# Patient Record
Sex: FEMALE | Race: WHITE | NOT HISPANIC OR LATINO | Employment: FULL TIME | URBAN - METROPOLITAN AREA
[De-identification: names, ages, dates, MRNs, and addresses within clinical notes are randomized per-mention and may not be internally consistent; named-entity substitution may affect disease eponyms.]

---

## 2017-01-23 LAB
ABO + RH BLD: NORMAL
ABO + RH BLD: NORMAL
RUBELLA ANTIBODY IGG QUANTITATIVE: NORMAL IU/ML

## 2017-02-20 ENCOUNTER — TRANSFERRED RECORDS (OUTPATIENT)
Dept: HEALTH INFORMATION MANAGEMENT | Facility: CLINIC | Age: 37
End: 2017-02-20

## 2017-02-21 ENCOUNTER — HOSPITAL ENCOUNTER (OUTPATIENT)
Facility: CLINIC | Age: 37
Discharge: HOME | End: 2017-02-21
Attending: OBSTETRICS & GYNECOLOGY | Admitting: OBSTETRICS & GYNECOLOGY
Payer: COMMERCIAL

## 2017-02-21 ENCOUNTER — TRANSFERRED RECORDS (OUTPATIENT)
Dept: HEALTH INFORMATION MANAGEMENT | Facility: CLINIC | Age: 37
End: 2017-02-21

## 2017-02-21 VITALS
SYSTOLIC BLOOD PRESSURE: 126 MMHG | RESPIRATION RATE: 18 BRPM | WEIGHT: 174 LBS | BODY MASS INDEX: 28.99 KG/M2 | DIASTOLIC BLOOD PRESSURE: 78 MMHG | TEMPERATURE: 98.5 F | HEIGHT: 65 IN

## 2017-02-21 PROBLEM — N94.89 ADNEXAL MASS: Status: ACTIVE | Noted: 2017-02-21

## 2017-02-21 LAB
ABO + RH BLD: NORMAL
ALBUMIN SERPL-MCNC: 3.7 G/DL (ref 3.4–5)
ALP SERPL-CCNC: 43 U/L (ref 40–150)
ALT SERPL W P-5'-P-CCNC: 20 U/L (ref 0–50)
ANION GAP SERPL CALCULATED.3IONS-SCNC: 5 MMOL/L (ref 3–14)
AST SERPL W P-5'-P-CCNC: 19 U/L (ref 0–45)
BILIRUB SERPL-MCNC: 0.4 MG/DL (ref 0.2–1.3)
BLD GP AB SCN SERPL QL: NORMAL
BLOOD BANK CMNT PATIENT-IMP: NORMAL
BLOOD BANK CMNT PATIENT-IMP: NORMAL
BUN SERPL-MCNC: 8 MG/DL (ref 7–30)
CALCIUM SERPL-MCNC: 8.4 MG/DL (ref 8.5–10.1)
CHLORIDE SERPL-SCNC: 107 MMOL/L (ref 94–109)
CO2 SERPL-SCNC: 28 MMOL/L (ref 20–32)
CREAT SERPL-MCNC: 0.72 MG/DL (ref 0.52–1.04)
DATE RH IMM GL GVN: NORMAL
ERYTHROCYTE [DISTWIDTH] IN BLOOD BY AUTOMATED COUNT: 13.5 % (ref 10–15)
FETAL CELL SCN BLD QL ROSETTE: NORMAL
GFR SERPL CREATININE-BSD FRML MDRD: ABNORMAL ML/MIN/1.7M2
GLUCOSE SERPL-MCNC: 106 MG/DL (ref 70–99)
HCT VFR BLD AUTO: 37.2 % (ref 35–47)
HGB BLD-MCNC: 12.5 G/DL (ref 11.7–15.7)
MCH RBC QN AUTO: 29.2 PG (ref 26.5–33)
MCHC RBC AUTO-ENTMCNC: 33.6 G/DL (ref 31.5–36.5)
MCV RBC AUTO: 87 FL (ref 78–100)
PLATELET # BLD AUTO: 234 10E9/L (ref 150–450)
POTASSIUM SERPL-SCNC: 4 MMOL/L (ref 3.4–5.3)
PROT SERPL-MCNC: 6.9 G/DL (ref 6.8–8.8)
RBC # BLD AUTO: 4.28 10E12/L (ref 3.8–5.2)
RH IG VIALS RECOM PATIENT: NORMAL
SODIUM SERPL-SCNC: 140 MMOL/L (ref 133–144)
SPECIMEN EXP DATE BLD: NORMAL
WBC # BLD AUTO: 6.9 10E9/L (ref 4–11)

## 2017-02-21 PROCEDURE — 96372 THER/PROPH/DIAG INJ SC/IM: CPT

## 2017-02-21 PROCEDURE — 85461 HEMOGLOBIN FETAL: CPT | Performed by: OBSTETRICS & GYNECOLOGY

## 2017-02-21 PROCEDURE — 96401 CHEMO ANTI-NEOPL SQ/IM: CPT

## 2017-02-21 PROCEDURE — 86850 RBC ANTIBODY SCREEN: CPT | Performed by: OBSTETRICS & GYNECOLOGY

## 2017-02-21 PROCEDURE — 86900 BLOOD TYPING SEROLOGIC ABO: CPT | Performed by: OBSTETRICS & GYNECOLOGY

## 2017-02-21 PROCEDURE — 25000128 H RX IP 250 OP 636: Performed by: OBSTETRICS & GYNECOLOGY

## 2017-02-21 PROCEDURE — 85027 COMPLETE CBC AUTOMATED: CPT | Performed by: OBSTETRICS & GYNECOLOGY

## 2017-02-21 PROCEDURE — 36415 COLL VENOUS BLD VENIPUNCTURE: CPT | Performed by: OBSTETRICS & GYNECOLOGY

## 2017-02-21 PROCEDURE — 86901 BLOOD TYPING SEROLOGIC RH(D): CPT | Mod: 91 | Performed by: OBSTETRICS & GYNECOLOGY

## 2017-02-21 PROCEDURE — 80053 COMPREHEN METABOLIC PANEL: CPT | Performed by: OBSTETRICS & GYNECOLOGY

## 2017-02-21 PROCEDURE — 25000125 ZZHC RX 250: Performed by: OBSTETRICS & GYNECOLOGY

## 2017-02-21 RX ORDER — METHOTREXATE 25 MG/ML
25 INJECTION, SOLUTION INTRA-ARTERIAL; INTRAMUSCULAR; INTRAVENOUS
Status: COMPLETED | OUTPATIENT
Start: 2017-02-21 | End: 2017-02-21

## 2017-02-21 RX ORDER — CYCLOSPORINE 0.5 MG/ML
1 EMULSION OPHTHALMIC 2 TIMES DAILY
COMMUNITY

## 2017-02-21 RX ADMIN — HUMAN RHO(D) IMMUNE GLOBULIN 300 MCG: 300 INJECTION, SOLUTION INTRAMUSCULAR at 14:02

## 2017-02-21 RX ADMIN — METHOTREXATE 47.5 MG: 25 INJECTION, SOLUTION INTRA-ARTERIAL; INTRAMUSCULAR; INTRATHECAL; INTRAVENOUS at 13:13

## 2017-02-21 RX ADMIN — METHOTREXATE 47.5 MG: 25 INJECTION, SOLUTION INTRA-ARTERIAL; INTRAMUSCULAR; INTRATHECAL; INTRAVENOUS at 13:10

## 2017-02-21 NOTE — IP AVS SNAPSHOT
[address]    [facility] [address]    Phone:  [phone]                                       After Visit Summary   2/21/2017    Janki Contreras    MRN: [id number]           After Visit Summary Signature Page     I have received my discharge instructions, and my questions have been answered. I have discussed any challenges I see with this plan with the nurse or doctor.    ..........................................................................................................................................  Patient/Patient Representative Signature      ..........................................................................................................................................  Patient Representative Print Name and Relationship to Patient    ..................................................               ................................................  Date                                            Time    ..........................................................................................................................................  Reviewed by Signature/Title    ...................................................              ..............................................  Date                                                            Time

## 2017-02-21 NOTE — DISCHARGE INSTRUCTIONS
Methotrexate to Treat an Ectopic Pregnancy  An ectopic pregnancy is when a fertilized egg implants outside the womb (uterus). In most cases, it implants in a fallopian tube. This is a tube that goes from the uterus to an ovary. When this happens, the embryo can t grow normally. In some cases, it may stop growing quickly. Or it may grow until the fallopian tube tears (ruptures). This can cause severe bleeding and a risk for death for the mother.  Methotrexate is a medicine that stops the embryo from growing. The tissue is then absorbed by the mother s body. This treatment can prevent the rupture, bleeding, and risk of death to the mother. Methotrexate is often used instead of surgery to remove the fetus. Surgery has risks, like bleeding, infection, scarring of the fallopian tube, infertility, and the risks of anesthesia.  Having methotrexate treatment  Methotrexate is most often given by a shot (injection) into a muscle. It can also be given through an IV.  After having the shot, you may have:    Mild abdominal pain or cramping    Nausea and vomiting    Diarrhea    Fatigue  Care at home  Once you are home, you can resume normal activities as you are able. You will have some bleeding and pain. While you are recovering, you can use acetaminophen for pain if advised by your healthcare provider.  Until your healthcare provider says it s OK, do NOT:    Take anti-inflammatory medicines (NSAIDs), like aspirin, ibuprofen, or naproxen    Have foods or vitamins that contain folic acid or folate (for example, prenatal vitamins have folate)    Drink alcohol    Take penicillin or certain other antibiotics    Use tampons or douche    Have sexual intercourse  Make sure to:    Avoid the sun during the first week after your shot. Sun can cause a rash during this time.    Use birth control for at least 3 months after treatment.    Talk with a counselor if you feel sadness or grief after pregnancy loss.  Follow up  You will have  blood tests several times in the weeks after you have the shot. This is to make sure that your pregnancy hormone (HCG) level is getting lower. This shows that the fetus is no longer growing. It may take up to 4 weeks for your level to drop to zero. Most women need only 1 shot. If HCG levels are not low enough, your healthcare provider may give you a second shot. In some cases, this treatment does not work and surgery is needed. Your healthcare provider can tell you more about surgery for ectopic pregnancy.     When to call the healthcare provider  Call your healthcare provider if you have:    Lower abdominal pain that gets worse or doesn t go away    Heavy vaginal bleeding    Nausea or vomiting that needs treatment    Dizziness, weakness, or fainting    Fever of 100.4 F (38 C) or higher     0182-1767 [employer]. [address] [place] [address], [place] [address]. All rights reserved. This information is not intended as a substitute for professional medical care. Always follow your healthcare professional's instructions.

## 2017-02-21 NOTE — IP AVS SNAPSHOT
MRN:[id number]                      After Visit Summary   2/21/2017    Janki Contreras    MRN: [id number]           Thank you!     Thank you for choosing [facility] for your care. Our goal is always to provide you with excellent care. Hearing back from our patients is one way we can continue to improve our services. Please take a few minutes to complete the written survey that you may receive in the mail after you visit with us. Thank you!        Patient Information     Date Of Birth          1980        About your hospital stay     You were admitted on:  February 21, 2017 You last received care in the:  [facility]    You were discharged on:  February 21, 2017       Who to Call     For medical emergencies, please call 911.  For non-urgent questions about your medical care, please call your primary care provider or clinic, None          Attending Provider     Provider Specialty    Linda Loco MD OB/Gyn       Primary Care Provider    None Specified       No primary provider on file.        After Care Instructions     Discharge Instructions                 Further instructions from your care team         Methotrexate to Treat an Ectopic Pregnancy  An ectopic pregnancy is when a fertilized egg implants outside the womb (uterus). In most cases, it implants in a fallopian tube. This is a tube that goes from the uterus to an ovary. When this happens, the embryo can t grow normally. In some cases, it may stop growing quickly. Or it may grow until the fallopian tube tears (ruptures). This can cause severe bleeding and a risk for death for the mother.  Methotrexate is a medicine that stops the embryo from growing. The tissue is then absorbed by the mother s body. This treatment can prevent the rupture, bleeding, and risk of death to the mother. Methotrexate is often used instead of surgery to remove the fetus. Surgery has risks, like bleeding, infection, scarring of  the fallopian tube, infertility, and the risks of anesthesia.  Having methotrexate treatment  Methotrexate is most often given by a shot (injection) into a muscle. It can also be given through an IV.  After having the shot, you may have:    Mild abdominal pain or cramping    Nausea and vomiting    Diarrhea    Fatigue  Care at home  Once you are home, you can resume normal activities as you are able. You will have some bleeding and pain. While you are recovering, you can use acetaminophen for pain if advised by your healthcare provider.  Until your healthcare provider says it s OK, do NOT:    Take anti-inflammatory medicines (NSAIDs), like aspirin, ibuprofen, or naproxen    Have foods or vitamins that contain folic acid or folate (for example, prenatal vitamins have folate)    Drink alcohol    Take penicillin or certain other antibiotics    Use tampons or douche    Have sexual intercourse  Make sure to:    Avoid the sun during the first week after your shot. Sun can cause a rash during this time.    Use birth control for at least 3 months after treatment.    Talk with a counselor if you feel sadness or grief after pregnancy loss.  Follow up  You will have blood tests several times in the weeks after you have the shot. This is to make sure that your pregnancy hormone (HCG) level is getting lower. This shows that the fetus is no longer growing. It may take up to 4 weeks for your level to drop to zero. Most women need only 1 shot. If HCG levels are not low enough, your healthcare provider may give you a second shot. In some cases, this treatment does not work and surgery is needed. Your healthcare provider can tell you more about surgery for ectopic pregnancy.     When to call the healthcare provider  Call your healthcare provider if you have:    Lower abdominal pain that gets worse or doesn t go away    Heavy vaginal bleeding    Nausea or vomiting that needs treatment    Dizziness, weakness, or fainting    Fever of  "100.4 F (38 C) or higher     5834-2214 [employer]. [address] [place] [address], [place] [address]. All rights reserved. This information is not intended as a substitute for professional medical care. Always follow your healthcare professional's instructions.          Pending Results     Date and Time Order Name Status Description    2017 1110 Rh Immune Globulin Study In process             Admission Information     Date & Time Provider Department Dept. Phone    2017 Ericheller, April MD [facility] [phone]      Your Vitals Were     Blood Pressure Temperature Respirations Height Weight BMI (Body Mass Index)    122/81 98.5  F (36.9  C) (Temporal) 18 1.651 m (5' 5\") 78.9 kg (174 lb) 28.96 kg/m2      [employer] Information     [employer] lets you send messages to your doctor, view your test results, renew your prescriptions, schedule appointments and more. To sign up, go to [email or web address] . Click on \"Log in\" on the left side of the screen, which will take you to the Welcome page. Then click on \"Sign up Now\" on the right side of the page.     You will be asked to enter the access code listed below, as well as some personal information. Please follow the directions to create your username and password.     Your access code is: [email or web address]  Expires: 2017  1:16 PM     Your access code will  in 90 days. If you need help or a new code, please call your [place] clinic or [phone].        Care EveryWhere ID     This is your Care EveryWhere ID. This could be used by other organizations to access your [facility] medical records  [id number]           Review of your medicines      UNREVIEWED medicines. Ask your doctor about these medicines        Dose / Directions    cycloSPORINE 0.05 % ophthalmic emulsion   Commonly known as:  RESTASIS        Dose:  1 drop   Place 1 drop into both eyes 2 times daily   Refills:  0       SYNTHROID PO   Indication:  Underactive " Thyroid        Dose:  25 mcg   Take 25 mcg by mouth daily   Refills:  0       VITAMIN D (CHOLECALCIFEROL) PO        Dose:  3000 Units   Take 3,000 Units by mouth daily   Refills:  0       ZANTAC PO        Dose:  150 mg   Take 150 mg by mouth 2 times daily   Refills:  0                Protect others around you: Learn how to safely use, store and throw away your medicines at www.disposemymeds.org.             Medication List: This is a list of all your medications and when to take them. Check marks below indicate your daily home schedule. Keep this list as a reference.      Medications           Morning Afternoon Evening Bedtime As Needed    cycloSPORINE 0.05 % ophthalmic emulsion   Commonly known as:  RESTASIS   Place 1 drop into both eyes 2 times daily                                SYNTHROID PO   Take 25 mcg by mouth daily                                VITAMIN D (CHOLECALCIFEROL) PO   Take 3,000 Units by mouth daily                                ZANTAC PO   Take 150 mg by mouth 2 times daily

## 2017-02-21 NOTE — PLAN OF CARE
1035: Pt presents to [facility] for Methotrexate Injection. Consent signed in clinic. US and HCG done. Other labs ordered.  HCG from 's office: 1/30/17 --42.2     2/3/17 -    99.4     2/6/17  -  118.8     2/20/17-- 146.3    Meds given (see MAR). Pt observed per unit protocol  Discharge instructions reviewed with pt who verbalized understanding.  Pt left unit, stable and ambulatory, at 1419.

## 2017-03-03 ENCOUNTER — APPOINTMENT (OUTPATIENT)
Dept: ULTRASOUND IMAGING | Facility: CLINIC | Age: 37
End: 2017-03-03
Attending: EMERGENCY MEDICINE
Payer: COMMERCIAL

## 2017-03-03 ENCOUNTER — HOSPITAL ENCOUNTER (EMERGENCY)
Facility: CLINIC | Age: 37
Discharge: HOME | End: 2017-03-03
Attending: EMERGENCY MEDICINE | Admitting: EMERGENCY MEDICINE
Payer: COMMERCIAL

## 2017-03-03 VITALS
RESPIRATION RATE: 14 BRPM | HEIGHT: 65 IN | SYSTOLIC BLOOD PRESSURE: 111 MMHG | BODY MASS INDEX: 28.32 KG/M2 | DIASTOLIC BLOOD PRESSURE: 68 MMHG | TEMPERATURE: 98.2 F | WEIGHT: 170 LBS | OXYGEN SATURATION: 96 %

## 2017-03-03 DIAGNOSIS — O00.109 TUBAL PREGNANCY WITHOUT INTRAUTERINE PREGNANCY: ICD-10-CM

## 2017-03-03 LAB
ALBUMIN SERPL-MCNC: 4 G/DL (ref 3.4–5)
ALBUMIN UR-MCNC: NEGATIVE MG/DL
ALP SERPL-CCNC: 49 U/L (ref 40–150)
ALT SERPL W P-5'-P-CCNC: 22 U/L (ref 0–50)
ANION GAP SERPL CALCULATED.3IONS-SCNC: 8 MMOL/L (ref 3–14)
APPEARANCE UR: CLEAR
AST SERPL W P-5'-P-CCNC: 16 U/L (ref 0–45)
B-HCG SERPL-ACNC: 159 IU/L
BASOPHILS # BLD AUTO: 0 10E9/L (ref 0–0.2)
BASOPHILS NFR BLD AUTO: 0.4 %
BILIRUB DIRECT SERPL-MCNC: <0.1 MG/DL (ref 0–0.2)
BILIRUB SERPL-MCNC: 0.4 MG/DL (ref 0.2–1.3)
BILIRUB UR QL STRIP: NEGATIVE
BUN SERPL-MCNC: 10 MG/DL (ref 7–30)
CALCIUM SERPL-MCNC: 8.9 MG/DL (ref 8.5–10.1)
CHLORIDE SERPL-SCNC: 105 MMOL/L (ref 94–109)
CO2 SERPL-SCNC: 26 MMOL/L (ref 20–32)
COLOR UR AUTO: ABNORMAL
CREAT SERPL-MCNC: 0.77 MG/DL (ref 0.52–1.04)
DIFFERENTIAL METHOD BLD: NORMAL
EOSINOPHIL # BLD AUTO: 0.3 10E9/L (ref 0–0.7)
EOSINOPHIL NFR BLD AUTO: 4.1 %
ERYTHROCYTE [DISTWIDTH] IN BLOOD BY AUTOMATED COUNT: 13.2 % (ref 10–15)
GFR SERPL CREATININE-BSD FRML MDRD: 86 ML/MIN/1.7M2
GLUCOSE SERPL-MCNC: 108 MG/DL (ref 70–99)
GLUCOSE UR STRIP-MCNC: NEGATIVE MG/DL
HCT VFR BLD AUTO: 37.2 % (ref 35–47)
HGB BLD-MCNC: 12.5 G/DL (ref 11.7–15.7)
HGB UR QL STRIP: ABNORMAL
IMM GRANULOCYTES # BLD: 0 10E9/L (ref 0–0.4)
IMM GRANULOCYTES NFR BLD: 0.1 %
KETONES UR STRIP-MCNC: NEGATIVE MG/DL
LEUKOCYTE ESTERASE UR QL STRIP: NEGATIVE
LYMPHOCYTES # BLD AUTO: 1.6 10E9/L (ref 0.8–5.3)
LYMPHOCYTES NFR BLD AUTO: 23.8 %
MCH RBC QN AUTO: 29.3 PG (ref 26.5–33)
MCHC RBC AUTO-ENTMCNC: 33.6 G/DL (ref 31.5–36.5)
MCV RBC AUTO: 87 FL (ref 78–100)
MONOCYTES # BLD AUTO: 0.4 10E9/L (ref 0–1.3)
MONOCYTES NFR BLD AUTO: 5.7 %
NEUTROPHILS # BLD AUTO: 4.5 10E9/L (ref 1.6–8.3)
NEUTROPHILS NFR BLD AUTO: 65.9 %
NITRATE UR QL: NEGATIVE
NRBC # BLD AUTO: 0 10*3/UL
NRBC BLD AUTO-RTO: 0 /100
PH UR STRIP: 6.5 PH (ref 5–7)
PLATELET # BLD AUTO: 193 10E9/L (ref 150–450)
POTASSIUM SERPL-SCNC: 3.8 MMOL/L (ref 3.4–5.3)
PROT SERPL-MCNC: 7.6 G/DL (ref 6.8–8.8)
RBC # BLD AUTO: 4.27 10E12/L (ref 3.8–5.2)
RBC #/AREA URNS AUTO: <1 /HPF (ref 0–2)
SODIUM SERPL-SCNC: 139 MMOL/L (ref 133–144)
SP GR UR STRIP: 1 (ref 1–1.03)
URN SPEC COLLECT METH UR: ABNORMAL
UROBILINOGEN UR STRIP-MCNC: NORMAL MG/DL (ref 0–2)
WBC # BLD AUTO: 6.8 10E9/L (ref 4–11)
WBC #/AREA URNS AUTO: 1 /HPF (ref 0–2)

## 2017-03-03 PROCEDURE — 99285 EMERGENCY DEPT VISIT HI MDM: CPT | Mod: 25

## 2017-03-03 PROCEDURE — 80076 HEPATIC FUNCTION PANEL: CPT | Performed by: EMERGENCY MEDICINE

## 2017-03-03 PROCEDURE — 96401 CHEMO ANTI-NEOPL SQ/IM: CPT

## 2017-03-03 PROCEDURE — 85025 COMPLETE CBC W/AUTO DIFF WBC: CPT | Performed by: EMERGENCY MEDICINE

## 2017-03-03 PROCEDURE — 81001 URINALYSIS AUTO W/SCOPE: CPT | Performed by: EMERGENCY MEDICINE

## 2017-03-03 PROCEDURE — 84702 CHORIONIC GONADOTROPIN TEST: CPT | Performed by: EMERGENCY MEDICINE

## 2017-03-03 PROCEDURE — 25000125 ZZHC RX 250: Performed by: EMERGENCY MEDICINE

## 2017-03-03 PROCEDURE — 76801 OB US < 14 WKS SINGLE FETUS: CPT

## 2017-03-03 PROCEDURE — 80048 BASIC METABOLIC PNL TOTAL CA: CPT | Performed by: EMERGENCY MEDICINE

## 2017-03-03 RX ORDER — METHOTREXATE 25 MG/ML
25 INJECTION, SOLUTION INTRA-ARTERIAL; INTRAMUSCULAR; INTRAVENOUS
Status: COMPLETED | OUTPATIENT
Start: 2017-03-03 | End: 2017-03-03

## 2017-03-03 RX ORDER — METHOTREXATE 25 MG/ML
25 INJECTION, SOLUTION INTRA-ARTERIAL; INTRAMUSCULAR; INTRAVENOUS
Status: DISPENSED | OUTPATIENT
Start: 2017-03-03 | End: 2017-03-03

## 2017-03-03 RX ADMIN — METHOTREXATE 47 MG: 25 INJECTION, SOLUTION INTRA-ARTERIAL; INTRAMUSCULAR; INTRAVENOUS at 15:02

## 2017-03-03 RX ADMIN — METHOTREXATE 47 MG: 25 INJECTION, SOLUTION INTRA-ARTERIAL; INTRAMUSCULAR; INTRAVENOUS at 15:01

## 2017-03-03 ASSESSMENT — ENCOUNTER SYMPTOMS
COUGH: 0
SHORTNESS OF BREATH: 0

## 2017-03-03 NOTE — ED PROVIDER NOTES
History     Chief Complaint:    Pelvic Pain       HPI   Janki Contreras is a  36 year old female with known ectopic (diagnosed ) who presents with pelvic pain. The patient underwent IVF in January with [facility] and had a low positive HCG of 118 5 weeks following. Patient was seen on  by Dr. Loco of [facility] with normal CMP and CBC, with HCG in the 140s and given 300 mcg Rhogam (type A neg) and methotrexate. Ultrasound showed a 1.7 cm right adenexal mass at that time.     One week ago she had onset of abdominal pain and spotting with wiping and had an HCG in the 240s. She spoke with her fertility doctor who reassurred her that cramping and spotting were side effects of Methotrexate. Three days ago her HCG had decreased by approximately 10. Yesterday evening she had recurrence of central pelvic cramping pain worse than prior which has moved to the right side and wraps around to her back. She has mild pain with urination but normal bowel movements. No recent pneumonia, cough, or shortness of breath. No history of UTI. [facility], had HCG and US performed there. Dr. Pascual, the MD who saw her sent her to the ED as they thought there was more free fluid in the pelvis. Patient's OB is Dr. Periera with [facility].     Allergies:  No known drug allergies.      Medications:    Levothyroxine  Zantac   Restasis     Past Medical History:    Adnexal mass  Thyroid disease  Ectopic     Past Surgical History:    Lumpectomy breast, elft  D & C  Lasik    Family History:    History reviewed. No pertinent family history.     Social History:  Marital Status:   Presents to the ED with   Tobacco Use: No  Alcohol Use: Yes       Review of Systems   Respiratory: Negative for cough and shortness of breath.    Genitourinary: Positive for pelvic pain.   All other systems reviewed and are negative.      Physical Exam   First Vitals:  BP: 129/89  Heart Rate: 83  Temp: 98.2  F  "(36.8  C)  Resp: 14  Height: 165.1 cm (5' 5\")  Weight: 77.1 kg (170 lb)  SpO2: 98 %    Physical Exam    Physical Exam   Constitutional:  Patient is oriented to person, place, and time. They appear well-developed and well-nourished. Mild distress secondary to pelvic pain.   HENT:   Mouth/Throat:   Oropharynx is clear and moist.   Eyes:    Conjunctivae normal and EOM are normal. Pupils are equal, round, and reactive to light.   Neck:    Normal range of motion.   Cardiovascular: Normal rate, regular rhythm and normal heart sounds.  Exam reveals no gallop and no friction rub.  No murmur heard.  Pulmonary/Chest:  Effort normal and breath sounds normal. Patient has no wheezes. Patient has no rales.   Abdominal:   Soft. Bowel sounds are normal. Patient exhibits no mass.  Mild pain in the right pelvic area. No guarding or rebound. Not a surgical abdomen.   Musculoskeletal:  Normal range of motion. Patient exhibits no edema.   Neurological:   Patient is alert and oriented to person, place, and time. Patient has normal strength. No cranial nerve deficit or sensory deficit. GCS 15  Skin:   Skin is warm and dry. No rash noted. No erythema.   Psychiatric:   Patient has a normal mood and affect. Patient's behavior is normal. Judgment and thought content normal.       Emergency Department Course     Imaging:  US OB <14 Weeks W Transvaginal  Final Result  IMPRESSION :  1. No intrauterine gestational sac. Complex right adnexal free fluid  and cul-de-sac free fluid which is small. Ill-defined hypoechoic  lobulated right adnexa as described above compatible with patient's  history of a known ectopic pregnancy.    Radiographic findings were communicated with the patient who voiced understanding of the findings.    Laboratory:  CBC:  WBC 6.8, HGB 12.5, , otherwise WNL  CMP: glucose 108 (H), otherwise WNL (Creatinine 0.77)   HCG Quant: 159    UA: Clear light yellow urine, blood trace,  otherwise WNL "     Interventions:  Methotrexate 47 mg     Emergency Department Course:  Nursing notes and vitals reviewed.  I performed an exam of the patient as documented above.   A peripheral IV was established. Blood was drawn from the patient. This was sent for laboratory testing, findings above.   Urine sample was obtained and sent for laboratory analysis, findings above.   The patient was sent for a ultrasound while in the emergency department, findings above.    (1059) I consulted with Dr. Fitzgerald from reproductive medicine ([facility]) regarding the patient.  (1107) I consulted with Dr. Santiago of OB regarding the patient.   (1147) I consulted with Dr. Santiago of OB regarding the patient.   (1150) Rechecked patient after speaking with Dr. Santiago and updated her. No decision has been made. She is waiting to speak with Dr. Christopher.   (1203) I consulted with Dr. Christopher from [facility] regarding the patient.   (1214) Patient was updated.  Findings and plan explained to the patient. Patient discharged home with instructions regarding supportive care, medications, and reasons to return. The importance of close follow-up was reviewed.     Impression & Plan      Medical Decision Making:  Janki Contreras is a 36 year old female with a known ectopic in the right adnexa given methotrexate on 2/21 at the order of [facility] at the hospital here. She has had serial HCGs. She has had a 24% drop since 2/28 to today. An ultrasound that was performed at [facility] today was concerning for a large amount of pelvic fluid so they sent her here. I was unable to view the ultrasound or blood work. I did do an ultrasound here. The findings are consistent with the known ectopic pregnancy. There was a small amount of complex right adnexal free fluids. It is not a large amount, this does not appear to be a ruptured ectopic. She does not have a surgical abdomen. She is hemodynamically stable. Her hemoglobin is 12.5 which is identical to what it was on 2/21 when  she received her methotrexate and Rhogam. Urine shows no evidence of infection or significant hematuria. Renal function and liver function are normal. Her HCG is 159. I spoke with Dr. Fitzgerald who is the patient's fertility specialist who is currently out of town but is aware of the patient's issues. She recommended I speak with Dr. Santiago who is her OBGYN. [facility] does not have privileges here. I then spoke with Dr. Santiago however Dr. Santiago then spoke with Dr. Christopher, Dr. Fitzgerald's partner. Dr. Christopher then called me back. At this time it was determined to re-dose with methotrexate because she is hemodynamically stable, has minimal pain, does not have an obvious ruptured ectopic, and her HCG's are lowering as expected. It was not thought that she needed surgery. She was recommended to follow up with [facility] on Tuesday for repeat HCG. She is aware that she will have continued spotting but should return if her flow increases to a pad an hour. She has had intermittent cramping and was also counseled regarding the redosing of methotrexate and that this may continue but to return to the emergency department if she has any changing or increasing pain. Patient understands this. She is of course worried about the potential complications. I feel that she is very educated and is very aware of things she should return immediately to the ED for. OB nurse came down to administer the methotrexate.     Diagnosis:    ICD-10-CM    1. Tubal pregnancy without intrauterine pregnancy O00.10        Disposition:  Discharge to home.       Cindy De La Torre  3/3/2017    EMERGENCY DEPARTMENT    I, Cindy De La Torre, am serving as a scribe on 3/3/2017 at 8:36 AM to personally document services performed by Dr. Ballard based on my observations and the provider's statements to me.         Veronica Ballard MD  03/04/17 0538

## 2017-03-03 NOTE — ED AVS SNAPSHOT
Emergency Department    [address] [facility] [address]    Phone:  [phone]    Fax:  [phone]                                       Janki Contreras   MRN: [id number]    Department:   Emergency Department   Date of Visit:  3/3/2017           After Visit Summary Signature Page     I have received my discharge instructions, and my questions have been answered. I have discussed any challenges I see with this plan with the nurse or doctor.    ..........................................................................................................................................  Patient/Patient Representative Signature      ..........................................................................................................................................  Patient Representative Print Name and Relationship to Patient    ..................................................               ................................................  Date                                            Time    ..........................................................................................................................................  Reviewed by Signature/Title    ...................................................              ..............................................  Date                                                            Time

## 2017-03-03 NOTE — ED AVS SNAPSHOT
Emergency Department    [address] [facility] [address]    Phone:  [phone]    Fax:  [phone]                                       Janki Contreras   MRN: [id number]    Department:   Emergency Department   Date of Visit:  3/3/2017           Patient Information     Date Of Birth          1980        Your diagnoses for this visit were:     Tubal pregnancy without intrauterine pregnancy        You were seen by Veronica Ballard MD.      Follow-up Information     Please follow up.    Why:  Follow  up with [facility] on Tues for repeat HCG. Return to ED immediately if you have increased bleeding or pelvic pain.      Discharge References/Attachments     ECTOPIC PREGNANCY, METHOTREXATE TO TREAT AN (ENGLISH)      24 Hour Appointment Hotline       To make an appointment at any [facility], call [phone] ([phone]). If you don't have a family doctor or clinic, we will help you find one. [facility] clinics are conveniently located to serve the needs of you and your family.             Review of your medicines      Our records show that you are taking the medicines listed below. If these are incorrect, please call your family doctor or clinic.        Dose / Directions Last dose taken    cycloSPORINE 0.05 % ophthalmic emulsion   Commonly known as:  RESTASIS   Dose:  1 drop        Place 1 drop into both eyes 2 times daily   Refills:  0        SYNTHROID PO   Dose:  25 mcg   Indication:  Underactive Thyroid        Take 25 mcg by mouth daily   Refills:  0        VITAMIN D (CHOLECALCIFEROL) PO   Dose:  3000 Units        Take 3,000 Units by mouth daily   Refills:  0        ZANTAC PO   Dose:  150 mg        Take 150 mg by mouth 2 times daily   Refills:  0                Procedures and tests performed during your visit     Basic metabolic panel    CBC with platelets differential    HCG quantitative pregnancy (blood)    Hepatic panel    UA with Microscopic    US OB <14 Weeks W  Transvaginal      Orders Needing Specimen Collection     None      Pending Results     No orders found from 3/1/2017 to 3/4/2017.            Pending Culture Results     No orders found from 3/1/2017 to 3/4/2017.             Test Results from your hospital stay     3/3/2017  9:02 AM - Interface, Flexilab Results      Component Results     Component Value Ref Range & Units Status    WBC 6.8 4.0 - 11.0 10e9/L Final    RBC Count 4.27 3.8 - 5.2 10e12/L Final    Hemoglobin 12.5 11.7 - 15.7 g/dL Final    Hematocrit 37.2 35.0 - 47.0 % Final    MCV 87 78 - 100 fl Final    MCH 29.3 26.5 - 33.0 pg Final    MCHC 33.6 31.5 - 36.5 g/dL Final    RDW 13.2 10.0 - 15.0 % Final    Platelet Count 193 150 - 450 10e9/L Final    Diff Method Automated Method  Final    % Neutrophils 65.9 % Final    % Lymphocytes 23.8 % Final    % Monocytes 5.7 % Final    % Eosinophils 4.1 % Final    % Basophils 0.4 % Final    % Immature Granulocytes 0.1 % Final    Nucleated RBCs 0 0 /100 Final    Absolute Neutrophil 4.5 1.6 - 8.3 10e9/L Final    Absolute Lymphocytes 1.6 0.8 - 5.3 10e9/L Final    Absolute Monocytes 0.4 0.0 - 1.3 10e9/L Final    Absolute Eosinophils 0.3 0.0 - 0.7 10e9/L Final    Absolute Basophils 0.0 0.0 - 0.2 10e9/L Final    Abs Immature Granulocytes 0.0 0 - 0.4 10e9/L Final    Absolute Nucleated RBC 0.0  Final         3/3/2017  9:17 AM - Interface, Flexilab Results      Component Results     Component Value Ref Range & Units Status    Sodium 139 133 - 144 mmol/L Final    Potassium 3.8 3.4 - 5.3 mmol/L Final    Chloride 105 94 - 109 mmol/L Final    Carbon Dioxide 26 20 - 32 mmol/L Final    Anion Gap 8 3 - 14 mmol/L Final    Glucose 108 (H) 70 - 99 mg/dL Final    Urea Nitrogen 10 7 - 30 mg/dL Final    Creatinine 0.77 0.52 - 1.04 mg/dL Final    GFR Estimate 86 >60 mL/min/1.7m2 Final    Non  GFR Calc    GFR Estimate If Black >90   GFR Calc   >60 mL/min/1.7m2 Final    Calcium 8.9 8.5 - 10.1 mg/dL Final          3/3/2017 10:57 AM - Interface, Flexilab Results      Component Results     Component Value Ref Range & Units Status    Color Urine Light Yellow  Final    Appearance Urine Clear  Final    Glucose Urine Negative NEG mg/dL Final    Bilirubin Urine Negative NEG Final    Ketones Urine Negative NEG mg/dL Final    Specific Gravity Urine 1.003 1.003 - 1.035 Final    Blood Urine Trace (A) NEG Final    pH Urine 6.5 5.0 - 7.0 pH Final    Protein Albumin Urine Negative NEG mg/dL Final    Urobilinogen mg/dL Normal 0.0 - 2.0 mg/dL Final    Nitrite Urine Negative NEG Final    Leukocyte Esterase Urine Negative NEG Final    Source Midstream Urine  Final    WBC Urine 1 0 - 2 /HPF Final    RBC Urine <1 0 - 2 /HPF Final         3/3/2017 11:04 AM - Interface, Radiant Ib      Narrative     US OB <14 WEEKS WITH TRANSVAGINAL SINGLE  3/3/2017 10:52 AM    HISTORY: Pregnancy with pain or bleeding patient has a history of  known ectopic on methotrexate on 2/21/2017.    TECHNIQUE: Transabdominal and endovaginal scan to better visualize  adnexa and endometrium.     COMPARISON:  None.    FINDINGS:      No intrauterine gestational sac. Normal-appearing uterus with 0.7 cm  endometrial stripe. Uterus is 8.8 x 4.6 x 5.6 cm in size.    There is a hypoechoic lobulated area in the right adnexal region  approximately 3.9 x 3.0 x 2.1 cm in size. Difficult to determine how  much of this represents right ovary, fallopian tube or complex fluid.  There is some adjacent more anterior complex fluid on the right as  well as small amount of complex fluid in the cul-de-sac. There is no  clearly defined gestational sac identified. Findings compatible with  the patient's clinical history of right ectopic pregnancy. These  findings were discussed by myself with Dr. Ballard at 10:58 AM on  3/3/2017.    Normal-appearing left ovary.        Impression     IMPRESSION :  1. No intrauterine gestational sac. Complex right adnexal free fluid  and cul-de-sac free fluid which  is small. Ill-defined hypoechoic  lobulated right adnexa as described above compatible with patient's  history of a known ectopic pregnancy.    JONNY LOCKWOOD MD         3/3/2017  9:22 AM - Interface, Flexilab Results      Component Results     Component Value Ref Range & Units Status    HCG Quantitative Serum 159 IU/L Final         3/3/2017 12:52 PM - Interface, Flexilab Results      Component Results     Component Value Ref Range & Units Status    Bilirubin Direct <0.1 0.0 - 0.2 mg/dL Final    Bilirubin Total 0.4 0.2 - 1.3 mg/dL Final    Albumin 4.0 3.4 - 5.0 g/dL Final    Protein Total 7.6 6.8 - 8.8 g/dL Final    Alkaline Phosphatase 49 40 - 150 U/L Final    ALT 22 0 - 50 U/L Final    AST 16 0 - 45 U/L Final                Clinical Quality Measure: Blood Pressure Screening     Your blood pressure was checked while you were in the emergency department today. The last reading we obtained was  BP: 111/68 . Please read the guidelines below about what these numbers mean and what you should do about them.  If your systolic blood pressure (the top number) is less than 120 and your diastolic blood pressure (the bottom number) is less than 80, then your blood pressure is normal. There is nothing more that you need to do about it.  If your systolic blood pressure (the top number) is 120-139 or your diastolic blood pressure (the bottom number) is 80-89, your blood pressure may be higher than it should be. You should have your blood pressure rechecked within a year by a primary care provider.  If your systolic blood pressure (the top number) is 140 or greater or your diastolic blood pressure (the bottom number) is 90 or greater, you may have high blood pressure. High blood pressure is treatable, but if left untreated over time it can put you at risk for heart attack, stroke, or kidney failure. You should have your blood pressure rechecked by a primary care provider within the next 4 weeks.  If your provider in the emergency  "department today gave you specific instructions to follow-up with your doctor or provider even sooner than that, you should follow that instruction and not wait for up to 4 weeks for your follow-up visit.        Thank you for choosing [facility]       Thank you for choosing [facility] for your care. Our goal is always to provide you with excellent care. Hearing back from our patients is one way we can continue to improve our services. Please take a few minutes to complete the written survey that you may receive in the mail after you visit with us. Thank you!        YepLike![email or web address] Information     Octm[employer] lets you send messages to your doctor, view your test results, renew your prescriptions, schedule appointments and more. To sign up, go to [email or web address] . Click on \"Log in\" on the left side of the screen, which will take you to the Welcome page. Then click on \"Sign up Now\" on the right side of the page.     You will be asked to enter the access code listed below, as well as some personal information. Please follow the directions to create your username and password.     Your access code is: [email or web address]  Expires: 2017  1:16 PM     Your access code will  in 90 days. If you need help or a new code, please call your [facility] clinic or [phone].        Care EveryWhere ID     This is your Care EveryWhere ID. This could be used by other organizations to access your [facility] medical records  [id number]        After Visit Summary       This is your record. Keep this with you and show to your community pharmacist(s) and doctor(s) at your next visit.                  "

## 2017-03-03 NOTE — ED NOTES
OB nurses came down to give Methotrexate to patient but 3 HCG levels needed with all in records. [facility] is contacted to receive 2 previous records of HCG levels.

## 2017-03-13 ENCOUNTER — HOSPITAL ENCOUNTER (OUTPATIENT)
Facility: CLINIC | Age: 37
Discharge: HOME | End: 2017-03-13
Attending: SPECIALIST | Admitting: SPECIALIST
Payer: COMMERCIAL

## 2017-03-13 ENCOUNTER — SURGERY (OUTPATIENT)
Age: 37
End: 2017-03-13

## 2017-03-13 ENCOUNTER — ANESTHESIA (OUTPATIENT)
Dept: SURGERY | Facility: CLINIC | Age: 37
End: 2017-03-13
Payer: COMMERCIAL

## 2017-03-13 ENCOUNTER — ANESTHESIA EVENT (OUTPATIENT)
Dept: SURGERY | Facility: CLINIC | Age: 37
End: 2017-03-13
Payer: COMMERCIAL

## 2017-03-13 VITALS
SYSTOLIC BLOOD PRESSURE: 120 MMHG | WEIGHT: 171.9 LBS | OXYGEN SATURATION: 97 % | RESPIRATION RATE: 16 BRPM | DIASTOLIC BLOOD PRESSURE: 77 MMHG | TEMPERATURE: 97.4 F | HEIGHT: 65 IN | BODY MASS INDEX: 28.64 KG/M2

## 2017-03-13 DIAGNOSIS — Z98.890 S/P LAPAROSCOPIC SURGERY: Primary | ICD-10-CM

## 2017-03-13 PROBLEM — O00.109 TUBAL PREGNANCY WITHOUT INTRAUTERINE PREGNANCY: Status: ACTIVE | Noted: 2017-03-13

## 2017-03-13 LAB
ABO + RH BLD: ABNORMAL
ABO + RH BLD: ABNORMAL
BLD GP AB INVEST PLASRBC-IMP: ABNORMAL
BLD GP AB SCN SERPL QL: ABNORMAL
BLOOD BANK CMNT PATIENT-IMP: ABNORMAL
HGB BLD-MCNC: 12.6 G/DL (ref 11.7–15.7)
PLATELET # BLD AUTO: 294 10E9/L (ref 150–450)
SPECIMEN EXP DATE BLD: ABNORMAL

## 2017-03-13 PROCEDURE — 27210995 ZZH RX 272: Performed by: SPECIALIST

## 2017-03-13 PROCEDURE — 37000008 ZZH ANESTHESIA TECHNICAL FEE, 1ST 30 MIN: Performed by: SPECIALIST

## 2017-03-13 PROCEDURE — 25800025 ZZH RX 258: Performed by: NURSE ANESTHETIST, CERTIFIED REGISTERED

## 2017-03-13 PROCEDURE — 86901 BLOOD TYPING SEROLOGIC RH(D): CPT | Performed by: SPECIALIST

## 2017-03-13 PROCEDURE — 40000170 ZZH STATISTIC PRE-PROCEDURE ASSESSMENT II: Performed by: SPECIALIST

## 2017-03-13 PROCEDURE — 27210794 ZZH OR GENERAL SUPPLY STERILE: Performed by: SPECIALIST

## 2017-03-13 PROCEDURE — 71000013 ZZH RECOVERY PHASE 1 LEVEL 1 EA ADDTL HR: Performed by: SPECIALIST

## 2017-03-13 PROCEDURE — 86900 BLOOD TYPING SEROLOGIC ABO: CPT | Performed by: SPECIALIST

## 2017-03-13 PROCEDURE — 36000056 ZZH SURGERY LEVEL 3 1ST 30 MIN: Performed by: SPECIALIST

## 2017-03-13 PROCEDURE — 36415 COLL VENOUS BLD VENIPUNCTURE: CPT | Performed by: SPECIALIST

## 2017-03-13 PROCEDURE — 71000012 ZZH RECOVERY PHASE 1 LEVEL 1 FIRST HR: Performed by: SPECIALIST

## 2017-03-13 PROCEDURE — 36000058 ZZH SURGERY LEVEL 3 EA 15 ADDTL MIN: Performed by: SPECIALIST

## 2017-03-13 PROCEDURE — 86850 RBC ANTIBODY SCREEN: CPT | Performed by: SPECIALIST

## 2017-03-13 PROCEDURE — 85049 AUTOMATED PLATELET COUNT: CPT | Performed by: SPECIALIST

## 2017-03-13 PROCEDURE — 37000009 ZZH ANESTHESIA TECHNICAL FEE, EACH ADDTL 15 MIN: Performed by: SPECIALIST

## 2017-03-13 PROCEDURE — 25000132 ZZH RX MED GY IP 250 OP 250 PS 637: Performed by: SPECIALIST

## 2017-03-13 PROCEDURE — 86870 RBC ANTIBODY IDENTIFICATION: CPT | Performed by: SPECIALIST

## 2017-03-13 PROCEDURE — 25000566 ZZH SEVOFLURANE, EA 15 MIN: Performed by: SPECIALIST

## 2017-03-13 PROCEDURE — 71000027 ZZH RECOVERY PHASE 2 EACH 15 MINS: Performed by: SPECIALIST

## 2017-03-13 PROCEDURE — 25000125 ZZHC RX 250: Performed by: SPECIALIST

## 2017-03-13 PROCEDURE — 85018 HEMOGLOBIN: CPT | Performed by: SPECIALIST

## 2017-03-13 PROCEDURE — 25000128 H RX IP 250 OP 636: Performed by: NURSE ANESTHETIST, CERTIFIED REGISTERED

## 2017-03-13 PROCEDURE — 88305 TISSUE EXAM BY PATHOLOGIST: CPT | Mod: 26,59 | Performed by: SPECIALIST

## 2017-03-13 PROCEDURE — 25000125 ZZHC RX 250: Performed by: ANESTHESIOLOGY

## 2017-03-13 PROCEDURE — 25000125 ZZHC RX 250: Performed by: NURSE ANESTHETIST, CERTIFIED REGISTERED

## 2017-03-13 PROCEDURE — 88305 TISSUE EXAM BY PATHOLOGIST: CPT | Performed by: SPECIALIST

## 2017-03-13 RX ORDER — FENTANYL CITRATE 50 UG/ML
25-50 INJECTION, SOLUTION INTRAMUSCULAR; INTRAVENOUS
Status: DISCONTINUED | OUTPATIENT
Start: 2017-03-13 | End: 2017-03-13 | Stop reason: HOSPADM

## 2017-03-13 RX ORDER — OXYCODONE AND ACETAMINOPHEN 5; 325 MG/1; MG/1
1-2 TABLET ORAL
Status: COMPLETED | OUTPATIENT
Start: 2017-03-13 | End: 2017-03-13

## 2017-03-13 RX ORDER — MAGNESIUM HYDROXIDE 1200 MG/15ML
LIQUID ORAL PRN
Status: DISCONTINUED | OUTPATIENT
Start: 2017-03-13 | End: 2017-03-13 | Stop reason: HOSPADM

## 2017-03-13 RX ORDER — FENTANYL CITRATE 50 UG/ML
INJECTION, SOLUTION INTRAMUSCULAR; INTRAVENOUS PRN
Status: DISCONTINUED | OUTPATIENT
Start: 2017-03-13 | End: 2017-03-13

## 2017-03-13 RX ORDER — ONDANSETRON 4 MG/1
4 TABLET, ORALLY DISINTEGRATING ORAL EVERY 30 MIN PRN
Status: DISCONTINUED | OUTPATIENT
Start: 2017-03-13 | End: 2017-03-13 | Stop reason: HOSPADM

## 2017-03-13 RX ORDER — IBUPROFEN 600 MG/1
600 TABLET, FILM COATED ORAL EVERY 6 HOURS PRN
Qty: 30 TABLET | Refills: 0 | Status: SHIPPED | OUTPATIENT
Start: 2017-03-13

## 2017-03-13 RX ORDER — ONDANSETRON 4 MG/1
4 TABLET, ORALLY DISINTEGRATING ORAL
Status: COMPLETED | OUTPATIENT
Start: 2017-03-13 | End: 2017-03-13

## 2017-03-13 RX ORDER — ONDANSETRON 2 MG/ML
4 INJECTION INTRAMUSCULAR; INTRAVENOUS EVERY 30 MIN PRN
Status: DISCONTINUED | OUTPATIENT
Start: 2017-03-13 | End: 2017-03-13 | Stop reason: HOSPADM

## 2017-03-13 RX ORDER — ONDANSETRON 2 MG/ML
INJECTION INTRAMUSCULAR; INTRAVENOUS PRN
Status: DISCONTINUED | OUTPATIENT
Start: 2017-03-13 | End: 2017-03-13

## 2017-03-13 RX ORDER — GLYCOPYRROLATE 0.2 MG/ML
INJECTION, SOLUTION INTRAMUSCULAR; INTRAVENOUS PRN
Status: DISCONTINUED | OUTPATIENT
Start: 2017-03-13 | End: 2017-03-13

## 2017-03-13 RX ORDER — DEXAMETHASONE SODIUM PHOSPHATE 4 MG/ML
INJECTION, SOLUTION INTRA-ARTICULAR; INTRALESIONAL; INTRAMUSCULAR; INTRAVENOUS; SOFT TISSUE PRN
Status: DISCONTINUED | OUTPATIENT
Start: 2017-03-13 | End: 2017-03-13

## 2017-03-13 RX ORDER — BUPIVACAINE HYDROCHLORIDE AND EPINEPHRINE 2.5; 5 MG/ML; UG/ML
INJECTION, SOLUTION INFILTRATION; PERINEURAL PRN
Status: DISCONTINUED | OUTPATIENT
Start: 2017-03-13 | End: 2017-03-13 | Stop reason: HOSPADM

## 2017-03-13 RX ORDER — SODIUM CHLORIDE, SODIUM LACTATE, POTASSIUM CHLORIDE, CALCIUM CHLORIDE 600; 310; 30; 20 MG/100ML; MG/100ML; MG/100ML; MG/100ML
INJECTION, SOLUTION INTRAVENOUS CONTINUOUS PRN
Status: DISCONTINUED | OUTPATIENT
Start: 2017-03-13 | End: 2017-03-13

## 2017-03-13 RX ORDER — MEPERIDINE HYDROCHLORIDE 25 MG/ML
12.5 INJECTION INTRAMUSCULAR; INTRAVENOUS; SUBCUTANEOUS
Status: DISCONTINUED | OUTPATIENT
Start: 2017-03-13 | End: 2017-03-13 | Stop reason: HOSPADM

## 2017-03-13 RX ORDER — PROPOFOL 10 MG/ML
INJECTION, EMULSION INTRAVENOUS PRN
Status: DISCONTINUED | OUTPATIENT
Start: 2017-03-13 | End: 2017-03-13

## 2017-03-13 RX ORDER — SODIUM CHLORIDE, SODIUM LACTATE, POTASSIUM CHLORIDE, CALCIUM CHLORIDE 600; 310; 30; 20 MG/100ML; MG/100ML; MG/100ML; MG/100ML
INJECTION, SOLUTION INTRAVENOUS CONTINUOUS
Status: DISCONTINUED | OUTPATIENT
Start: 2017-03-13 | End: 2017-03-13 | Stop reason: HOSPADM

## 2017-03-13 RX ORDER — HYDROMORPHONE HYDROCHLORIDE 1 MG/ML
.3-.5 INJECTION, SOLUTION INTRAMUSCULAR; INTRAVENOUS; SUBCUTANEOUS EVERY 10 MIN PRN
Status: DISCONTINUED | OUTPATIENT
Start: 2017-03-13 | End: 2017-03-13 | Stop reason: HOSPADM

## 2017-03-13 RX ORDER — NEOSTIGMINE METHYLSULFATE 1 MG/ML
VIAL (ML) INJECTION PRN
Status: DISCONTINUED | OUTPATIENT
Start: 2017-03-13 | End: 2017-03-13

## 2017-03-13 RX ORDER — IBUPROFEN 600 MG/1
600 TABLET, FILM COATED ORAL
Status: COMPLETED | OUTPATIENT
Start: 2017-03-13 | End: 2017-03-13

## 2017-03-13 RX ORDER — VECURONIUM BROMIDE 1 MG/ML
INJECTION, POWDER, LYOPHILIZED, FOR SOLUTION INTRAVENOUS PRN
Status: DISCONTINUED | OUTPATIENT
Start: 2017-03-13 | End: 2017-03-13

## 2017-03-13 RX ORDER — OXYCODONE AND ACETAMINOPHEN 5; 325 MG/1; MG/1
1-2 TABLET ORAL EVERY 4 HOURS PRN
Qty: 25 TABLET | Refills: 0 | Status: SHIPPED | OUTPATIENT
Start: 2017-03-13

## 2017-03-13 RX ORDER — LIDOCAINE HYDROCHLORIDE 20 MG/ML
INJECTION, SOLUTION INFILTRATION; PERINEURAL PRN
Status: DISCONTINUED | OUTPATIENT
Start: 2017-03-13 | End: 2017-03-13

## 2017-03-13 RX ORDER — NALOXONE HYDROCHLORIDE 0.4 MG/ML
.1-.4 INJECTION, SOLUTION INTRAMUSCULAR; INTRAVENOUS; SUBCUTANEOUS
Status: DISCONTINUED | OUTPATIENT
Start: 2017-03-13 | End: 2017-03-13 | Stop reason: HOSPADM

## 2017-03-13 RX ADMIN — SODIUM CHLORIDE, POTASSIUM CHLORIDE, SODIUM LACTATE AND CALCIUM CHLORIDE: 600; 310; 30; 20 INJECTION, SOLUTION INTRAVENOUS at 16:10

## 2017-03-13 RX ADMIN — FENTANYL CITRATE 100 MCG: 50 INJECTION, SOLUTION INTRAMUSCULAR; INTRAVENOUS at 16:12

## 2017-03-13 RX ADMIN — OXYCODONE HYDROCHLORIDE AND ACETAMINOPHEN 1 TABLET: 5; 325 TABLET ORAL at 18:37

## 2017-03-13 RX ADMIN — FENTANYL CITRATE 50 MCG: 50 INJECTION, SOLUTION INTRAMUSCULAR; INTRAVENOUS at 18:08

## 2017-03-13 RX ADMIN — FENTANYL CITRATE 50 MCG: 50 INJECTION, SOLUTION INTRAMUSCULAR; INTRAVENOUS at 17:27

## 2017-03-13 RX ADMIN — PROPOFOL 200 MG: 10 INJECTION, EMULSION INTRAVENOUS at 16:12

## 2017-03-13 RX ADMIN — BUPIVACAINE HYDROCHLORIDE AND EPINEPHRINE BITARTRATE 9 ML: 2.5; .005 INJECTION, SOLUTION INFILTRATION; PERINEURAL at 17:40

## 2017-03-13 RX ADMIN — FENTANYL CITRATE 50 MCG: 50 INJECTION, SOLUTION INTRAMUSCULAR; INTRAVENOUS at 17:02

## 2017-03-13 RX ADMIN — GLYCOPYRROLATE 0.6 MG: 0.2 INJECTION, SOLUTION INTRAMUSCULAR; INTRAVENOUS at 17:37

## 2017-03-13 RX ADMIN — VECURONIUM BROMIDE 1 MG: 1 INJECTION, POWDER, LYOPHILIZED, FOR SOLUTION INTRAVENOUS at 17:02

## 2017-03-13 RX ADMIN — ROCURONIUM BROMIDE 10 MG: 10 INJECTION INTRAVENOUS at 16:45

## 2017-03-13 RX ADMIN — FENTANYL CITRATE 50 MCG: 50 INJECTION, SOLUTION INTRAMUSCULAR; INTRAVENOUS at 18:17

## 2017-03-13 RX ADMIN — FENTANYL CITRATE 50 MCG: 50 INJECTION, SOLUTION INTRAMUSCULAR; INTRAVENOUS at 16:37

## 2017-03-13 RX ADMIN — SODIUM CHLORIDE, POTASSIUM CHLORIDE, SODIUM LACTATE AND CALCIUM CHLORIDE: 600; 310; 30; 20 INJECTION, SOLUTION INTRAVENOUS at 17:13

## 2017-03-13 RX ADMIN — IBUPROFEN 600 MG: 600 TABLET ORAL at 18:37

## 2017-03-13 RX ADMIN — SODIUM CHLORIDE 1000 ML: 0.9 IRRIGANT IRRIGATION at 16:30

## 2017-03-13 RX ADMIN — ROCURONIUM BROMIDE 40 MG: 10 INJECTION INTRAVENOUS at 16:13

## 2017-03-13 RX ADMIN — ONDANSETRON 4 MG: 4 TABLET, ORALLY DISINTEGRATING ORAL at 18:45

## 2017-03-13 RX ADMIN — ONDANSETRON 4 MG: 2 INJECTION INTRAMUSCULAR; INTRAVENOUS at 17:32

## 2017-03-13 RX ADMIN — NEOSTIGMINE METHYLSULFATE 4 MG: 1 INJECTION INTRAMUSCULAR; INTRAVENOUS; SUBCUTANEOUS at 17:37

## 2017-03-13 RX ADMIN — MIDAZOLAM HYDROCHLORIDE 2 MG: 1 INJECTION, SOLUTION INTRAMUSCULAR; INTRAVENOUS at 16:10

## 2017-03-13 RX ADMIN — DEXAMETHASONE SODIUM PHOSPHATE 4 MG: 4 INJECTION, SOLUTION INTRAMUSCULAR; INTRAVENOUS at 16:21

## 2017-03-13 RX ADMIN — LIDOCAINE HYDROCHLORIDE 100 MG: 20 INJECTION, SOLUTION INFILTRATION; PERINEURAL at 16:12

## 2017-03-13 ASSESSMENT — LIFESTYLE VARIABLES: TOBACCO_USE: 0

## 2017-03-13 ASSESSMENT — ENCOUNTER SYMPTOMS: SEIZURES: 0

## 2017-03-13 NOTE — IP AVS SNAPSHOT
MRN:[id number]                      After Visit Summary   3/13/2017    Janki Contreras    MRN: [id number]           Thank you!     Thank you for choosing [facility] for your care. Our goal is always to provide you with excellent care. Hearing back from our patients is one way we can continue to improve our services. Please take a few minutes to complete the written survey that you may receive in the mail after you visit with us. Thank you!        Patient Information     Date Of Birth          1980        About your hospital stay     You were admitted on:  March 13, 2017 [facility]    You were discharged on:  March 13, 2017       Who to Call     For medical emergencies, please call 911.  For non-urgent questions about your medical care, please call your primary care provider or clinic, [phone]  For questions related to your surgery, please call your surgery clinic        Attending Provider     Provider Kiara Mora MD OB/Gyn       Primary Care Provider Office Phone # Fax #    Adam Kenny PA-C [phone]       [facility] [address] [place] [address]        After Care Instructions     Discharge Instructions       Resume pre procedure diet            Discharge Instructions       Pelvic Rest. No tampons, douching or intercourse for one  week.            Discharge Instructions       Patient to arrange follow up appointment in 1-2 weeks            No alcohol       NO ALCOHOL for 24 hours post procedure            No driving or operating machinery       No driving or operating machinery until day after procedure            No lifting       No lifting over 15 pounds for 1 week.  No strenuous activity X 48 hours.            Shower        OK to shower or bathe.                  Further instructions from your care team       Same Day Surgery Discharge Instructions for  Sedation and General  Anesthesia       It's not unusual to feel dizzy, light-headed or faint for up to 24 hours after surgery or while taking pain medication.  If you have these symptoms: sit for a few minutes before standing and have someone assist you when you get up to walk or use the bathroom.      You should rest and relax for the next 24 hours. We recommend you make arrangements to have an adult stay with you for at least 24 hours after your discharge.  Avoid hazardous and strenuous activity.      DO NOT DRIVE any vehicle or operate mechanical equipment for 24 hours following the end of your surgery.  Even though you may feel normal, your reactions may be affected by the medication you have received.      Do not drink alcoholic beverages for 24 hours following surgery.       Slowly progress to your regular diet as you feel able. It's not unusual to feel nauseated and/or vomit after receiving anesthesia.  If you develop these symptoms, drink clear liquids (apple juice, ginger ale, broth, 7-up, etc. ) until you feel better.  If your nausea and vomiting persists for 24 hours, please notify your surgeon.        All narcotic pain medications, along with inactivity and anesthesia, can cause constipation. Drinking plenty of liquids and increasing fiber intake will help.      For any questions of a medical nature, call your surgeon.      Do not make important decisions for 24 hours.      If you had general anesthesia, you may have a sore throat for a couple of days related to the breathing tube used during surgery.  You may use Cepacol lozenges to help with this discomfort.  If it worsens or if you develop a fever, contact your surgeon.       If you feel your pain is not well managed with the pain medications prescribed by your surgeon, please contact your surgeon's office to let them know so they can address your concerns.         LAPAROSCOPY DISCHARGE INSTRUCTIONS      ACTIVITY:    After 24 hours, you may resume normal activities  "including lifting as the abdominal discomfort disappears.  You may drive a car after 24 hours, as long as you are not taking narcotics.    Showers are perfectly acceptable.    VAGINAL DISCHARGE:   You may have some vaginal bleeding or discharge for about a week after discharge.      STITCHES:      There is usually a stitch under the skin in the incision, which will dissolve and does not need to be removed.  The Band-Aids may be removed at any time.      WHEN TO CALL YOUR DOCTOR:  Call your doctor right away if you have any of the following:  Fever above 100.4 F (38 C) or chills  Vaginal bleeding that soaks more than one sanitary pad per hour  A foul smelling discharge from the vagina  Difficulty urinating  Severe pain   Redness, swelling, or drainage at your incision sites                  [facility]   ROBIN Mayo, VIKKI Larose,    MIRANDA Wood M.Kasbohm & MARICARMEN Han      [facility] Office:    [facility] Office:    [address] [place] [address]    [address] [phone]    [place], [place] [id number] [phone]      While you were at the hospital today you received Toradol, an antiinflammatory medication similar to Ibuprofen.  You should not take other antiinflammatory medication, such as Ibuprofen, Motrin, Advil, Aleve, Naprosyn, etc, until 1230am.       **If you have questions or concerns about your procedure  call Dr. Emery at [phone]**        Pending Results     Date and Time Order Name Status Description    3/13/2017 1502 Platelet count In process             Admission Information     Date & Time Provider Department Dept. Phone    3/13/2017 Kiara Emery MD [facility] PACU [phone]      Your Vitals Were     Blood Pressure Temperature Respirations Height Weight Last Period    124/78 97.4  F (36.3  C) (Temporal) 16 1.651 m (5' 5\") 78 kg (171 lb 14.4 oz) 03/10/2017    Pulse Oximetry " "BMI (Body Mass Index)                95% 28.61 kg/m2          RecycleMatchharRapt Information     L[employer] lets you send messages to your doctor, view your test results, renew your prescriptions, schedule appointments and more. To sign up, go to [email or web address] . Click on \"Log in\" on the left side of the screen, which will take you to the Welcome page. Then click on \"Sign up Now\" on the right side of the page.     You will be asked to enter the access code listed below, as well as some personal information. Please follow the directions to create your username and password.     Your access code is: [email or web address]  Expires: 2017  2:16 PM     Your access code will  in 90 days. If you need help or a new code, please call your [facility] clinic or [phone].        Care EveryWhere ID     This is your Care EveryWhere ID. This could be used by other organizations to access your [facility] medical records  [id number]           Review of your medicines      START taking        Dose / Directions    ibuprofen 600 MG tablet   Commonly known as:  ADVIL/MOTRIN   Notes to Patient:  One tablet taken at 6:37pm.        Dose:  600 mg   Take 1 tablet (600 mg) by mouth every 6 hours as needed for pain (mild)   Quantity:  30 tablet   Refills:  0       oxyCODONE-acetaminophen 5-325 MG per tablet   Commonly known as:  PERCOCET   Notes to Patient:  One tablet taken at 6:37 pm.        Dose:  1-2 tablet   Take 1-2 tablets by mouth every 4 hours as needed for pain (moderate to severe)   Quantity:  25 tablet   Refills:  0         CONTINUE these medicines which have NOT CHANGED        Dose / Directions    cycloSPORINE 0.05 % ophthalmic emulsion   Commonly known as:  RESTASIS        Dose:  1 drop   Place 1 drop into both eyes 2 times daily   Refills:  0       SYNTHROID PO   Indication:  Underactive Thyroid        Dose:  25 mcg   Take 25 mcg by mouth daily   Refills:  0       VITAMIN D (CHOLECALCIFEROL) PO        Dose:  3000 Units   Take " 3,000 Units by mouth daily   Refills:  0       ZANTAC PO        Dose:  150 mg   Take 150 mg by mouth 2 times daily   Refills:  0            Where to get your medicines      Some of these will need a paper prescription and others can be bought over the counter. Ask your nurse if you have questions.     Bring a paper prescription for each of these medications     ibuprofen 600 MG tablet    oxyCODONE-acetaminophen 5-325 MG per tablet                Protect others around you: Learn how to safely use, store and throw away your medicines at www.disposemymeds.org.             Medication List: This is a list of all your medications and when to take them. Check marks below indicate your daily home schedule. Keep this list as a reference.      Medications           Morning Afternoon Evening Bedtime As Needed    cycloSPORINE 0.05 % ophthalmic emulsion   Commonly known as:  RESTASIS   Place 1 drop into both eyes 2 times daily                                ibuprofen 600 MG tablet   Commonly known as:  ADVIL/MOTRIN   Take 1 tablet (600 mg) by mouth every 6 hours as needed for pain (mild)   Last time this was given:  600 mg on 3/13/2017  6:37 PM   Notes to Patient:  One tablet taken at 6:37pm.                                oxyCODONE-acetaminophen 5-325 MG per tablet   Commonly known as:  PERCOCET   Take 1-2 tablets by mouth every 4 hours as needed for pain (moderate to severe)   Last time this was given:  1 tablet on 3/13/2017  6:37 PM   Notes to Patient:  One tablet taken at 6:37 pm.                                SYNTHROID PO   Take 25 mcg by mouth daily                                VITAMIN D (CHOLECALCIFEROL) PO   Take 3,000 Units by mouth daily                                ZANTAC PO   Take 150 mg by mouth 2 times daily

## 2017-03-13 NOTE — DISCHARGE INSTRUCTIONS
Same Day Surgery Discharge Instructions for  Sedation and General Anesthesia       It's not unusual to feel dizzy, light-headed or faint for up to 24 hours after surgery or while taking pain medication.  If you have these symptoms: sit for a few minutes before standing and have someone assist you when you get up to walk or use the bathroom.      You should rest and relax for the next 24 hours. We recommend you make arrangements to have an adult stay with you for at least 24 hours after your discharge.  Avoid hazardous and strenuous activity.      DO NOT DRIVE any vehicle or operate mechanical equipment for 24 hours following the end of your surgery.  Even though you may feel normal, your reactions may be affected by the medication you have received.      Do not drink alcoholic beverages for 24 hours following surgery.       Slowly progress to your regular diet as you feel able. It's not unusual to feel nauseated and/or vomit after receiving anesthesia.  If you develop these symptoms, drink clear liquids (apple juice, ginger ale, broth, 7-up, etc. ) until you feel better.  If your nausea and vomiting persists for 24 hours, please notify your surgeon.        All narcotic pain medications, along with inactivity and anesthesia, can cause constipation. Drinking plenty of liquids and increasing fiber intake will help.      For any questions of a medical nature, call your surgeon.      Do not make important decisions for 24 hours.      If you had general anesthesia, you may have a sore throat for a couple of days related to the breathing tube used during surgery.  You may use Cepacol lozenges to help with this discomfort.  If it worsens or if you develop a fever, contact your surgeon.       If you feel your pain is not well managed with the pain medications prescribed by your surgeon, please contact your surgeon's office to let them know so they can address your concerns.         LAPAROSCOPY DISCHARGE INSTRUCTIONS       ACTIVITY:    After 24 hours, you may resume normal activities including lifting as the abdominal discomfort disappears.  You may drive a car after 24 hours, as long as you are not taking narcotics.    Showers are perfectly acceptable.    VAGINAL DISCHARGE:   You may have some vaginal bleeding or discharge for about a week after discharge.      STITCHES:      There is usually a stitch under the skin in the incision, which will dissolve and does not need to be removed.  The Band-Aids may be removed at any time.      WHEN TO CALL YOUR DOCTOR:  Call your doctor right away if you have any of the following:  Fever above 100.4 F (38 C) or chills  Vaginal bleeding that soaks more than one sanitary pad per hour  A foul smelling discharge from the vagina  Difficulty urinating  Severe pain   Redness, swelling, or drainage at your incision sites                  [facility]   ROBIN Mayo M. Hanno,    TAJ Badillo, Sonido Villafuerte & MARICARMEN Han      [facility] Office:    [facility] Office:    [address] [place] [address]    [address] [phone]    [place], [place] [id number] [phone]      While you were at the hospital today you received Toradol, an antiinflammatory medication similar to Ibuprofen.  You should not take other antiinflammatory medication, such as Ibuprofen, Motrin, Advil, Aleve, Naprosyn, etc, until 1230am.       **If you have questions or concerns about your procedure  call Dr. Emery at [phone]**

## 2017-03-13 NOTE — ANESTHESIA PREPROCEDURE EVALUATION
Anesthesia Evaluation     . Pt has had prior anesthetic.     No history of anesthetic complications     ROS/MED HX    ENT/Pulmonary:     (+)Intermittent asthma Treatment: Inhaler prn,  , . .   (-) tobacco use and sleep apnea   Neurologic:      (-) seizures, CVA and migraines   Cardiovascular:        (-) hypertension and MENDEZ   METS/Exercise Tolerance:     Hematologic:         Musculoskeletal:         GI/Hepatic:     (+) GERD Asymptomatic on medication,      (-) liver disease   Renal/Genitourinary:      (-) renal disease   Endo:     (+) thyroid problem hypothyroidism, .   (-) Type II DM, chronic steroid usage and obesity   Psychiatric:         Infectious Disease:         Malignancy:         Other:               Physical Exam  Normal systems: cardiovascular and pulmonary    Airway   Mallampati: III  TM distance: >3 FB  Neck ROM: full    Dental   (+) upper retainer and lower retainer    Cardiovascular       Pulmonary                     Anesthesia Plan      History & Physical Review  History and physical reviewed and following examination; no interval change.    ASA Status:  2 .    NPO Status:  > 8 hours    Plan for General and ETT with Propofol induction. Maintenance will be Balanced.    PONV prophylaxis:  Ondansetron (or other 5HT-3) and Dexamethasone or Solumedrol  Additional equipment: Videolaryngoscope      Postoperative Care  Postoperative pain management:  IV analgesics.      Consents  Anesthetic plan, risks, benefits and alternatives discussed with:  Patient..                          .

## 2017-03-13 NOTE — ANESTHESIA CARE TRANSFER NOTE
Patient: Janki Contreras    Procedure(s):  HYSTEROSCOPY, D&C, LAPAROSCOPY, REMOVAL OF ECTOPIC PREGNANCY, POSSIBLE CAUTERY ENDOMETRIOSIS - Wound Class: II-Clean Contaminated   - Wound Class: I-Clean    Diagnosis: Ectopic Pregnancy  Diagnosis Additional Information: No value filed.    Anesthesia Type:   General, ETT     Note:  Airway :Face Mask  Patient transferred to:PACU        Vitals: (Last set prior to Anesthesia Care Transfer)    CRNA VITALS  3/13/2017 1720 - 3/13/2017 1753      3/13/2017             Pulse: 87    SpO2: 100 %    Resp Rate (observed): 16    Resp Rate (set): 10                Electronically Signed By: AARON Johnson CRNA  March 13, 2017  5:53 PM

## 2017-03-13 NOTE — PROGRESS NOTES
To Whom It May Concern:    Janki Contreras underwent surgery on 3/13/2017 and will not be able to return to work until 3/20/2017.      Thank you for your cooperation.    Kiara Emery ....................  3/13/2017   6:34 PM

## 2017-03-13 NOTE — PROCEDURES
Procedure reviewed with patient, risks discussed, including but not limited to bleeding, transfusion, infection, uterine adhesions/scarring, uterine perforation, and possible retained tissue, which could require additional surgery.  All questions answered. Consent obtained.    Pre-op Dx: abnormal early pregnancy of unknown location, right adnexal mass  Post-op Dx: same  surgeon-Kiara Emery M.D. And Linda Loco MD  anesthesia-general  Procedure- D and C, laparoscopic right partial salpingectomy including ectopic pregnancy  findings- Distended right Fallopian tube with ectopic pregnancy, normal appearing ovaries and uterus, blood in the anterior and posterior cul-de-sacs  EBL- 10 cc  Complications-none apparent      Procedure-  After adequate level of sedation, the patient was prepped and draped in the usual fashion.  Her bladder was drained with a catheter.  A time out was performed, and all in attendance were in agreement of the planned surgery, and the patient was correctly identified, and precautions addressed.    A speculum was placed in the vagina.  The cervix was then prepped and grasped with a single tooth tenaculum and dilated to accomodate a curette.  The uterus was scraped until a gritty texture was appreciated.  This tissue was labeled uterine contents and sent for permanent analysis.  A uterine manipulator was placed and articulated to the tenaculum.    Attention was then turned to the abdominal portion of the case.  I removed my top gloves and placed new top gloves.  The infraumbilical skin fold was injected with 3 cc of 0.25% marcaine and a 0.5 cm infraumbilical incision was made, and a step trocar veris needle was placed, with its correct intraperitoneal position confirmed with the saline drop method.   It took several tries to obtain intra-abdominal placement due to the patient having a thick subcutaneous layer.  A pneumoperitoneum was then established by insufflating the abdominal cavity  with approximately 2.5 L of CO2.  The step-type trocar was then expanded to allow a 5 mm laparoscope to be placed.  The scope was then placed and the patient placed in trendelenburg position.  A second and third 5mm port site was placed under direct visualization, on the patient's left and right sides.    The findings were as noted above.   Photographs were taken.    An ectopic pregnancy was identified which distended the right Fallopian tube.  The tube overlying the ectopic was opened using the PK.  The ectoptic tissue was extracted. Upon removal of the ectopic, the Fallopian tube was noted to be almost completely transected, so the decision was made to remove the right Fallopian tube.  The fimbriated end of the Fallopian tube appeared adherent to the IP ligament, so this was left in place, but the remained of the tube was removed.  The PK was used to obtain hemostasis of the broad ligament.  The left lower quadrant port was then extended to accommodate a 10 mm trochar and the endocatch bag was inserted at this site. The Fallopian tube and ectopic tissue were placed in the bag, the bag was extracted, and the tissue sent for permanent analysis.  The pelvis was irrigated and clotted blood was removed from the anterior and posterior sul-de-sacs.  The broad ligament was confirmed to be hemostatic.  The pneumoperitoneum was then evacuated.  The trochar were removed under direct laparoscopic surveillance.  The left lower quadrant port fascia was closed with 0 Vicryl and the skin at that site was closed with 4-0 Vicryl.  The other two sites were closed with 4-0 Monocryl.  Steristrips and band aids were placed. The instruments were removed from the patient's vagina and the cervix was noted to be hemostatic.        The patient will be discharged to home when awake and alert.   Findings were discussed with the patient's family after the procedure, all questions answered.  A copy of photographs will be sent home with the  patient.     Kiara Hook Yuly ....................  3/13/2017   6:14 PM , pager: [phone]

## 2017-03-13 NOTE — IP AVS SNAPSHOT
[address]    [facility] [address]    Phone:  [phone]                                       After Visit Summary   3/13/2017    Janki Contreras    MRN: [id number]           After Visit Summary Signature Page     I have received my discharge instructions, and my questions have been answered. I have discussed any challenges I see with this plan with the nurse or doctor.    ..........................................................................................................................................  Patient/Patient Representative Signature      ..........................................................................................................................................  Patient Representative Print Name and Relationship to Patient    ..................................................               ................................................  Date                                            Time    ..........................................................................................................................................  Reviewed by Signature/Title    ...................................................              ..............................................  Date                                                            Time

## 2017-03-13 NOTE — H&P (VIEW-ONLY)
History     Chief Complaint:    Pelvic Pain       HPI   Janki Contreras is a  36 year old female with known ectopic (diagnosed ) who presents with pelvic pain. The patient underwent IVF in January with [facility] and had a low positive HCG of 118 5 weeks following. Patient was seen on  by Dr. Loco of [facility] with normal CMP and CBC, with HCG in the 140s and given 300 mcg Rhogam (type A neg) and methotrexate. Ultrasound showed a 1.7 cm right adenexal mass at that time.     One week ago she had onset of abdominal pain and spotting with wiping and had an HCG in the 240s. She spoke with her fertility doctor who reassurred her that cramping and spotting were side effects of Methotrexate. Three days ago her HCG had decreased by approximately 10. Yesterday evening she had recurrence of central pelvic cramping pain worse than prior which has moved to the right side and wraps around to her back. She has mild pain with urination but normal bowel movements. No recent pneumonia, cough, or shortness of breath. No history of UTI. [facility], had HCG and US performed there. Dr. Pascual, the MD who saw her sent her to the ED as they thought there was more free fluid in the pelvis. Patient's OB is Dr. Pereira with [facility].     Allergies:  No known drug allergies.      Medications:    Levothyroxine  Zantac   Restasis     Past Medical History:    Adnexal mass  Thyroid disease  Ectopic     Past Surgical History:    Lumpectomy breast, elft  D & C  Lasik    Family History:    History reviewed. No pertinent family history.     Social History:  Marital Status:   Presents to the ED with   Tobacco Use: No  Alcohol Use: Yes       Review of Systems   Respiratory: Negative for cough and shortness of breath.    Genitourinary: Positive for pelvic pain.   All other systems reviewed and are negative.      Physical Exam   First Vitals:  BP: 129/89  Heart Rate: 83  Temp: 98.2  F  "(36.8  C)  Resp: 14  Height: 165.1 cm (5' 5\")  Weight: 77.1 kg (170 lb)  SpO2: 98 %    Physical Exam    Physical Exam   Constitutional:  Patient is oriented to person, place, and time. They appear well-developed and well-nourished. Mild distress secondary to pelvic pain.   HENT:   Mouth/Throat:   Oropharynx is clear and moist.   Eyes:    Conjunctivae normal and EOM are normal. Pupils are equal, round, and reactive to light.   Neck:    Normal range of motion.   Cardiovascular: Normal rate, regular rhythm and normal heart sounds.  Exam reveals no gallop and no friction rub.  No murmur heard.  Pulmonary/Chest:  Effort normal and breath sounds normal. Patient has no wheezes. Patient has no rales.   Abdominal:   Soft. Bowel sounds are normal. Patient exhibits no mass.  Mild pain in the right pelvic area. No guarding or rebound. Not a surgical abdomen.   Musculoskeletal:  Normal range of motion. Patient exhibits no edema.   Neurological:   Patient is alert and oriented to person, place, and time. Patient has normal strength. No cranial nerve deficit or sensory deficit. GCS 15  Skin:   Skin is warm and dry. No rash noted. No erythema.   Psychiatric:   Patient has a normal mood and affect. Patient's behavior is normal. Judgment and thought content normal.       Emergency Department Course     Imaging:  US OB <14 Weeks W Transvaginal  Final Result  IMPRESSION :  1. No intrauterine gestational sac. Complex right adnexal free fluid  and cul-de-sac free fluid which is small. Ill-defined hypoechoic  lobulated right adnexa as described above compatible with patient's  history of a known ectopic pregnancy.    Radiographic findings were communicated with the patient who voiced understanding of the findings.    Laboratory:  CBC:  WBC 6.8, HGB 12.5, , otherwise WNL  CMP: glucose 108 (H), otherwise WNL (Creatinine 0.77)   HCG Quant: 159    UA: Clear light yellow urine, blood trace,  otherwise WNL "     Interventions:  Methotrexate 47 mg     Emergency Department Course:  Nursing notes and vitals reviewed.  I performed an exam of the patient as documented above.   A peripheral IV was established. Blood was drawn from the patient. This was sent for laboratory testing, findings above.   Urine sample was obtained and sent for laboratory analysis, findings above.   The patient was sent for a ultrasound while in the emergency department, findings above.    (1059) I consulted with Dr. Fitzgerald from reproductive medicine ([facility]) regarding the patient.  (1107) I consulted with Dr. Santiago of OB regarding the patient.   (1147) I consulted with Dr. Santiago of OB regarding the patient.   (1150) Rechecked patient after speaking with Dr. Santiago and updated her. No decision has been made. She is waiting to speak with Dr. Christopher.   (1203) I consulted with Dr. Christopher from [facility] regarding the patient.   (1214) Patient was updated.  Findings and plan explained to the patient. Patient discharged home with instructions regarding supportive care, medications, and reasons to return. The importance of close follow-up was reviewed.     Impression & Plan      Medical Decision Making:  Janki Contreras is a 36 year old female with a known ectopic in the right adnexa given methotrexate on 2/21 at the order of [facility] at the hospital here. She has had serial HCGs. She has had a 24% drop since 2/28 to today. An ultrasound that was performed at [facility] today was concerning for a large amount of pelvic fluid so they sent her here. I was unable to view the ultrasound or blood work. I did do an ultrasound here. The findings are consistent with the known ectopic pregnancy. There was a small amount of complex right adnexal free fluids. It is not a large amount, this does not appear to be a ruptured ectopic. She does not have a surgical abdomen. She is hemodynamically stable. Her hemoglobin is 12.5 which is identical to what it was on 2/21 when  she received her methotrexate and Rhogam. Urine shows no evidence of infection or significant hematuria. Renal function and liver function are normal. Her HCG is 159. I spoke with Dr. Fitzgerald who is the patient's fertility specialist who is currently out of town but is aware of the patient's issues. She recommended I speak with Dr. Santiago who is her OBGYN. [facility] does not have privileges here. I then spoke with Dr. Santiago however Dr. Santiago then spoke with Dr. Christopher, Dr. Fitzgerald's partner. Dr. Christopher then called me back. At this time it was determined to re-dose with methotrexate because she is hemodynamically stable, has minimal pain, does not have an obvious ruptured ectopic, and her HCG's are lowering as expected. It was not thought that she needed surgery. She was recommended to follow up with [facility] on Tuesday for repeat HCG. She is aware that she will have continued spotting but should return if her flow increases to a pad an hour. She has had intermittent cramping and was also counseled regarding the redosing of methotrexate and that this may continue but to return to the emergency department if she has any changing or increasing pain. Patient understands this. She is of course worried about the potential complications. I feel that she is very educated and is very aware of things she should return immediately to the ED for. OB nurse came down to administer the methotrexate.     Diagnosis:    ICD-10-CM    1. Tubal pregnancy without intrauterine pregnancy O00.10        Disposition:  Discharge to home.       Cindy De La Torre  3/3/2017    EMERGENCY DEPARTMENT    I, Cindy De La Torre, am serving as a scribe on 3/3/2017 at 8:36 AM to personally document services performed by Dr. Ballard based on my observations and the provider's statements to me.         Veronica Ballard MD  03/04/17 0538

## 2017-03-14 NOTE — OR NURSING
PNDS met, po per I&O sheet. Pt dressed, up in recliner and transported to Phase 2.   No Rhogam needed per Dr. Emery (pt had previous dose in office)

## 2017-03-15 LAB — COPATH REPORT: NORMAL

## 2019-10-08 NOTE — ANESTHESIA POSTPROCEDURE EVALUATION
Patient: Janki Contreras    Procedure(s):   D&C, LAPAROSCOPY Partial Right Salpingectomy and including ECTOPIC PREGNANCY - Wound Class: II-Clean Contaminated   - Wound Class: II-Clean Contaminated    Diagnosis:Ectopic Pregnancy  Diagnosis Additional Information: No value filed.    Anesthesia Type:  General, ETT    Note:  Anesthesia Post Evaluation    Patient location during evaluation: PACU  Patient participation: Able to fully participate in evaluation  Level of consciousness: awake and alert  Pain management: adequate  Airway patency: patent  Cardiovascular status: acceptable  Respiratory status: acceptable  Hydration status: acceptable  PONV: none and controlled     Anesthetic complications: None          Last vitals:  Vitals:    03/13/17 1815 03/13/17 1830 03/13/17 1845   BP: 119/74 124/78 120/77   Resp: 16 16 16   Temp:      SpO2: 99% 95% 97%         Electronically Signed By: Bennie Fay MD  March 13, 2017  10:38 PM  
(2) more than 100 beats/min

## 2021-07-27 ENCOUNTER — HOSPITAL ENCOUNTER (EMERGENCY)
Facility: CLINIC | Age: 41
Discharge: LEFT BEFORE DISCHARGE | End: 2021-07-27
Payer: COMMERCIAL

## 2021-07-27 VITALS
SYSTOLIC BLOOD PRESSURE: 135 MMHG | TEMPERATURE: 98.6 F | HEIGHT: 65 IN | OXYGEN SATURATION: 95 % | RESPIRATION RATE: 18 BRPM | BODY MASS INDEX: 28.61 KG/M2 | DIASTOLIC BLOOD PRESSURE: 88 MMHG | HEART RATE: 71 BPM

## 2021-07-27 NOTE — ED TRIAGE NOTES
Patient reports  after uncomplicated pregnancy last Wednesday. Is noticing increased swelling on right leg and foot. Denies dizziness, shortness of breath, or pain. Called OB and they recommended she be seen tonight in the ED.

## 2021-07-28 NOTE — ED NOTES
Patient called back to [facility] to be protocol'ed at 2005. Patient asked for delay, as she would like to go breastfeed in her car. Will monitor lobby for patient's return.

## 2024-10-03 ENCOUNTER — TELEPHONE (OUTPATIENT)
Dept: OPHTHALMOLOGY | Facility: CLINIC | Age: 44
End: 2024-10-03
Payer: COMMERCIAL

## 2024-10-03 ENCOUNTER — TRANSCRIBE ORDERS (OUTPATIENT)
Dept: OTHER | Age: 44
End: 2024-10-03

## 2024-10-03 DIAGNOSIS — G08 ACUTE IDIOPATHIC CVST: Primary | ICD-10-CM

## 2024-10-03 NOTE — TELEPHONE ENCOUNTER
Health Call Center    Phone Message    May a detailed message be left on voicemail: yes     Reason for Call: Appointment Intake    Referring Provider Name: Referred by MD Naveed [facility] [address] [place] [address]  Phone: [phone] Fax: [phone]       Diagnosis and/or Symptoms: Acute idiopathic CVST     The pt would like to schedule, please review, dx is not in the scheduling protocols    Action Taken: Message routed to:  [facility] eye    Travel Screening: Not Applicable     Date of Service:

## 2024-10-03 NOTE — TELEPHONE ENCOUNTER
[phone] home/mobile    Called times two and no answer and mailbox full at [phone]    Barrett Ching RN 4:13 PM 10/03/24

## 2024-10-04 NOTE — TELEPHONE ENCOUNTER
Spoke to pt at 0850    No vision changes    Offered next available with ophthalmology in December vs sooner for baseline exam with Optometry.    After review, scheduled October 28th with Dr. Aguilar.    Pt aware of date/time/location at [facility]/hospital based clinic/main clinic number/[employer]/non-[employer] insurance.    Barrett Ching RN 8:54 AM 10/04/24

## 2024-10-28 ENCOUNTER — OFFICE VISIT (OUTPATIENT)
Dept: OPHTHALMOLOGY | Facility: CLINIC | Age: 44
End: 2024-10-28
Attending: OPTOMETRIST
Payer: COMMERCIAL

## 2024-10-28 DIAGNOSIS — H16.213 EXPOSURE KERATOCONJUNCTIVITIS OF BOTH EYES: ICD-10-CM

## 2024-10-28 DIAGNOSIS — I63.6 CEREBROVASCULAR ACCIDENT (CVA) DUE TO NONPYOGENIC CEREBRAL VENOUS THROMBOSIS (H): ICD-10-CM

## 2024-10-28 DIAGNOSIS — H10.13 ALLERGIC CONJUNCTIVITIS OF BOTH EYES: Primary | ICD-10-CM

## 2024-10-28 PROCEDURE — 92133 CPTRZD OPH DX IMG PST SGM ON: CPT | Performed by: OPTOMETRIST

## 2024-10-28 PROCEDURE — 92083 EXTENDED VISUAL FIELD XM: CPT | Performed by: OPTOMETRIST

## 2024-10-28 PROCEDURE — 99213 OFFICE O/P EST LOW 20 MIN: CPT | Performed by: OPTOMETRIST

## 2024-10-28 PROCEDURE — 92004 COMPRE OPH EXAM NEW PT 1/>: CPT | Performed by: OPTOMETRIST

## 2024-10-28 RX ORDER — LEVETIRACETAM 750 MG/1
750 TABLET ORAL 2 TIMES DAILY
COMMUNITY

## 2024-10-28 ASSESSMENT — CONF VISUAL FIELD
METHOD: COUNTING FINGERS
OS_SUPERIOR_NASAL_RESTRICTION: 0
OS_NORMAL: 1
OD_INFERIOR_NASAL_RESTRICTION: 0
OS_INFERIOR_NASAL_RESTRICTION: 0
OD_SUPERIOR_TEMPORAL_RESTRICTION: 0
OD_SUPERIOR_NASAL_RESTRICTION: 0
OS_SUPERIOR_TEMPORAL_RESTRICTION: 0
OD_INFERIOR_TEMPORAL_RESTRICTION: 0
OS_INFERIOR_TEMPORAL_RESTRICTION: 0
OD_NORMAL: 1

## 2024-10-28 ASSESSMENT — TONOMETRY
OS_IOP_MMHG: 7
IOP_METHOD: TONOPEN
OD_IOP_MMHG: 9

## 2024-10-28 ASSESSMENT — CUP TO DISC RATIO
OD_RATIO: 0.3
OS_RATIO: 0.3

## 2024-10-28 ASSESSMENT — VISUAL ACUITY
OD_SC: 20/20
OS_SC: 20/20
METHOD: SNELLEN - LINEAR

## 2024-10-28 ASSESSMENT — SLIT LAMP EXAM - LIDS
COMMENTS: NORMAL
COMMENTS: NORMAL

## 2024-10-28 ASSESSMENT — EXTERNAL EXAM - LEFT EYE: OS_EXAM: NORMAL

## 2024-10-28 ASSESSMENT — EXTERNAL EXAM - RIGHT EYE: OD_EXAM: NORMAL

## 2024-10-28 NOTE — PATIENT INSTRUCTIONS
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

## 2024-10-28 NOTE — PROGRESS NOTES
Assessment & Plan       Janki Contreras is a 44 year old female with the following diagnoses:   1. Allergic conjunctivitis of both eyes - Both Eyes    2. 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          Complete documentation of historical and exam elements from today's encounter can be found in the full encounter summary report (not reduplicated in this progress note). I personally obtained the chief complaint(s) and history of present illness.  I confirmed and edited as necessary the review of systems, past medical/surgical history, family history, social history, and examination findings as documented by others; and I examined the patient myself. I personally reviewed the relevant tests, images, and reports as documented above. I formulated and edited as necessary the assessment and plan and discussed the findings and management plan with the patient and family.  Dr. Jr Aguilar

## 2024-10-28 NOTE — NURSING NOTE
Chief Complaints and History of Present Illnesses   Patient presents with    baseline eye exam     Chief Complaint(s) and History of Present Illness(es)       baseline eye exam               Comments    Janki is here referred by neurology for a baseline eye exam due to idiopathic cerebral venous sinus thrombosis. She states no pain in her eyes. History of LASIK BE.   Denies floaters or flashes. Recent history of headaches. She does not wear glasses or contacts.     Jose Huertas COT 10:25 AM October 28, 2024                        
No

## (undated) DEVICE — Device

## (undated) DEVICE — ENDO POUCH GOLD 10MM ECATCH 173050G

## (undated) DEVICE — SU VICRYL 4-0 PS-2 18" UND J496H

## (undated) DEVICE — DRSG TELFA 3X8" 1238

## (undated) DEVICE — ESU FCP OLYMPUS PK CUTTING 5MMX33CM PK-CF0533

## (undated) DEVICE — NDL INSUFFLATION 14GA STEP S100000

## (undated) DEVICE — ESU CORD MONOPOLAR 10'  E0510

## (undated) DEVICE — ESU HOLDER LAP INST DISP PURPLE LONG 330MM H-PRO-330

## (undated) DEVICE — SUCTION IRR STRYKERFLOW II W/TIP 250-070-520

## (undated) DEVICE — GLOVE PROTEXIS MICRO 6.0  2D73PM60

## (undated) DEVICE — ENDO TROCAR 05MM VERSASTEP VS101005

## (undated) DEVICE — DRSG BANDAID 1X3" FABRIC CURITY LATEX FREE KC44101

## (undated) DEVICE — SU MONOCRYL 4-0 PS-2 18" UND Y496G

## (undated) DEVICE — CATH INTERMITTENT CLEAN-CATH FEMALE 14FR 6" VINYL LF 420614

## (undated) DEVICE — GLOVE PROTEXIS BLUE W/NEU-THERA 6.5  2D73EB65

## (undated) DEVICE — DRSG STERI STRIP 1/2X4" R1547

## (undated) DEVICE — SOL NACL 0.9% IRRIG 1000ML BOTTLE 07138-09

## (undated) DEVICE — SOL NACL 0.9% INJ 1000ML BAG 2B1324X

## (undated) DEVICE — PREP DURAPREP 26ML APL 8630

## (undated) DEVICE — SU VICRYL 0 CT-2 27" J334H

## (undated) DEVICE — LINEN TOWEL PACK X5 5464

## (undated) DEVICE — TUBING CYSTO/BLADDER IRRIG SET 80" 06544-01

## (undated) DEVICE — SOL ADH LIQUID BENZOIN SWAB 0.6ML C1544

## (undated) DEVICE — SUCTION IRR TRUMPET VALVE LAP ASU1201

## (undated) DEVICE — ENDO TROCAR 12MM VERSASTEP VS101012P

## (undated) DEVICE — SUCTION CANISTER MEDIVAC LINER 3000ML W/LID 65651-530

## (undated) RX ORDER — VECURONIUM BROMIDE 1 MG/ML
INJECTION, POWDER, LYOPHILIZED, FOR SOLUTION INTRAVENOUS
Status: DISPENSED
Start: 2017-03-13

## (undated) RX ORDER — IBUPROFEN 600 MG/1
TABLET, FILM COATED ORAL
Status: DISPENSED
Start: 2017-03-13

## (undated) RX ORDER — GLYCOPYRROLATE 0.2 MG/ML
INJECTION, SOLUTION INTRAMUSCULAR; INTRAVENOUS
Status: DISPENSED
Start: 2017-03-13

## (undated) RX ORDER — NEOSTIGMINE METHYLSULFATE 1 MG/ML
VIAL (ML) INJECTION
Status: DISPENSED
Start: 2017-03-13

## (undated) RX ORDER — LIDOCAINE HYDROCHLORIDE 10 MG/ML
INJECTION, SOLUTION EPIDURAL; INFILTRATION; INTRACAUDAL; PERINEURAL
Status: DISPENSED
Start: 2017-03-13

## (undated) RX ORDER — FENTANYL CITRATE 50 UG/ML
INJECTION, SOLUTION INTRAMUSCULAR; INTRAVENOUS
Status: DISPENSED
Start: 2017-03-13

## (undated) RX ORDER — OXYCODONE AND ACETAMINOPHEN 5; 325 MG/1; MG/1
TABLET ORAL
Status: DISPENSED
Start: 2017-03-13

## (undated) RX ORDER — LIDOCAINE HYDROCHLORIDE 20 MG/ML
INJECTION, SOLUTION EPIDURAL; INFILTRATION; INTRACAUDAL; PERINEURAL
Status: DISPENSED
Start: 2017-03-13

## (undated) RX ORDER — PROPOFOL 10 MG/ML
INJECTION, EMULSION INTRAVENOUS
Status: DISPENSED
Start: 2017-03-13

## (undated) RX ORDER — BUPIVACAINE HYDROCHLORIDE AND EPINEPHRINE 2.5; 5 MG/ML; UG/ML
INJECTION, SOLUTION EPIDURAL; INFILTRATION; INTRACAUDAL; PERINEURAL
Status: DISPENSED
Start: 2017-03-13

## (undated) RX ORDER — ONDANSETRON 2 MG/ML
INJECTION INTRAMUSCULAR; INTRAVENOUS
Status: DISPENSED
Start: 2017-03-13

## (undated) RX ORDER — DEXAMETHASONE SODIUM PHOSPHATE 4 MG/ML
INJECTION, SOLUTION INTRA-ARTICULAR; INTRALESIONAL; INTRAMUSCULAR; INTRAVENOUS; SOFT TISSUE
Status: DISPENSED
Start: 2017-03-13

## (undated) RX ORDER — ACYCLOVIR 200 MG/1
CAPSULE ORAL
Status: DISPENSED
Start: 2017-03-13

## (undated) RX ORDER — ONDANSETRON 4 MG/1
TABLET, ORALLY DISINTEGRATING ORAL
Status: DISPENSED
Start: 2017-03-13